# Patient Record
Sex: FEMALE | Race: WHITE | URBAN - METROPOLITAN AREA
[De-identification: names, ages, dates, MRNs, and addresses within clinical notes are randomized per-mention and may not be internally consistent; named-entity substitution may affect disease eponyms.]

---

## 2017-01-05 ENCOUNTER — OFFICE VISIT (OUTPATIENT)
Dept: OBGYN | Facility: CLINIC | Age: 32
End: 2017-01-05
Payer: COMMERCIAL

## 2017-01-05 VITALS
DIASTOLIC BLOOD PRESSURE: 66 MMHG | WEIGHT: 172.4 LBS | SYSTOLIC BLOOD PRESSURE: 122 MMHG | HEIGHT: 69 IN | BODY MASS INDEX: 25.53 KG/M2

## 2017-01-05 DIAGNOSIS — Z12.4 SCREENING FOR CERVICAL CANCER: ICD-10-CM

## 2017-01-05 DIAGNOSIS — Z01.419 ENCOUNTER FOR GYNECOLOGICAL EXAMINATION WITHOUT ABNORMAL FINDING: Primary | ICD-10-CM

## 2017-01-05 DIAGNOSIS — Z30.09 FAMILY PLANNING COUNSELING: ICD-10-CM

## 2017-01-05 DIAGNOSIS — Z11.51 SCREENING FOR HUMAN PAPILLOMAVIRUS: ICD-10-CM

## 2017-01-05 DIAGNOSIS — Z31.41 FERTILITY TESTING: ICD-10-CM

## 2017-01-05 PROCEDURE — 87624 HPV HI-RISK TYP POOLED RSLT: CPT | Mod: 90 | Performed by: OBSTETRICS & GYNECOLOGY

## 2017-01-05 PROCEDURE — 99385 PREV VISIT NEW AGE 18-39: CPT | Performed by: OBSTETRICS & GYNECOLOGY

## 2017-01-05 PROCEDURE — 99000 SPECIMEN HANDLING OFFICE-LAB: CPT | Performed by: OBSTETRICS & GYNECOLOGY

## 2017-01-05 PROCEDURE — G0145 SCR C/V CYTO,THINLAYER,RESCR: HCPCS | Mod: 90 | Performed by: OBSTETRICS & GYNECOLOGY

## 2017-01-05 ASSESSMENT — PATIENT HEALTH QUESTIONNAIRE - PHQ9: 5. POOR APPETITE OR OVEREATING: NOT AT ALL

## 2017-01-05 ASSESSMENT — ANXIETY QUESTIONNAIRES
3. WORRYING TOO MUCH ABOUT DIFFERENT THINGS: SEVERAL DAYS
5. BEING SO RESTLESS THAT IT IS HARD TO SIT STILL: NOT AT ALL
2. NOT BEING ABLE TO STOP OR CONTROL WORRYING: SEVERAL DAYS
GAD7 TOTAL SCORE: 3
1. FEELING NERVOUS, ANXIOUS, OR ON EDGE: NOT AT ALL
IF YOU CHECKED OFF ANY PROBLEMS ON THIS QUESTIONNAIRE, HOW DIFFICULT HAVE THESE PROBLEMS MADE IT FOR YOU TO DO YOUR WORK, TAKE CARE OF THINGS AT HOME, OR GET ALONG WITH OTHER PEOPLE: NOT DIFFICULT AT ALL
7. FEELING AFRAID AS IF SOMETHING AWFUL MIGHT HAPPEN: NOT AT ALL
6. BECOMING EASILY ANNOYED OR IRRITABLE: SEVERAL DAYS

## 2017-01-05 NOTE — MR AVS SNAPSHOT
After Visit Summary   1/5/2017    Aysha Gary    MRN: [id number]           Patient Information     Date Of Birth          1985        Visit Information        Provider Department      1/5/2017 3:20 PM Rosangela Worthington DO [facility]        Today's Diagnoses     Encounter for gynecological examination without abnormal finding [Z01.419]    -  1     Screening for human papillomavirus         Screening for cervical cancer         Family planning counseling         Fertility testing           Care Instructions    -Call on day 1 of next cycle:      -Scheduled lab only appointment for day 3: FSH, estradiol, AMH, TSH/Free T4      -Schedule HSG (hysterosalpingogram) for within the first 10 days of cycle.     -Consumer Price Line: [phone]    -HSG: procedure 17646    -FSH lab86 , estradiol [id number], AMH or anti-mullerian hormone [id number], TSH, Free T4 [id number]        Follow-ups after your visit        Follow-up notes from your care team     Return in about 3 weeks (around 1/26/2017).      Your next 10 appointments already scheduled     Jan 13, 2017  8:10 AM   LAB with [facility]   [facility] ([facility])    [address] [place] [id number]   [phone]           Patient must bring picture ID.  Patient should be prepared to give a urine specimen  Please do not eat 10-12 hours before your appointment if you are coming in fasting for labs on lipids, cholesterol, or glucose (sugar).  Pregnant women should follow their Care Team instructions. Water with medications is okay. Do not drink coffee or other fluids.   If you have concerns about taking  your medications, please ask at office or if scheduling via [employer], send a message by clicking on Secure Messaging, Message Your Care Team.              Who to contact     If you have questions or need follow up information about today's clinic visit or your schedule  "please contact [facility] directly at [phone].  Normal or non-critical lab and imaging results will be communicated to you by MyChart, letter or phone within 4 business days after the clinic has received the results. If you do not hear from us within 7 days, please contact the clinic through MyChart or phone. If you have a critical or abnormal lab result, we will notify you by phone as soon as possible.  Submit refill requests through [facility] [employer] or call your pharmacy and they will forward the refill request to us. Please allow 3 business days for your refill to be completed.          Additional Information About Your Visit        [employer] Information     Front Row lets you send messages to your doctor, view your test results, renew your prescriptions, schedule appointments and more. To sign up, go to [email or web address] . Click on \"Log in\" on the left side of the screen, which will take you to the Welcome page. Then click on \"Sign up Now\" on the right side of the page.     You will be asked to enter the access code listed below, as well as some personal information. Please follow the directions to create your username and password.     Your access code is: [email or web address]  Expires: 2017  4:14 PM     Your access code will  in 90 days. If you need help or a new code, please call your [place] clinic or [phone].        Care EveryWhere ID     This is your Care EveryWhere ID. This could be used by other organizations to access your [place] medical records  [id number]        Your Vitals Were     Height BMI (Body Mass Index) Last Period             5' 8.5\" (1.74 m) 25.83 kg/m2 2016 (Exact Date)          Blood Pressure from Last 3 Encounters:   17 122/66   16 105/66    Weight from Last 3 Encounters:   17 172 lb 6.4 oz (78.2 kg)   16 160 lb 14.4 oz (72.984 kg)              We Performed the Following     HPV High Risk Types DNA Cervical     Pap imaged thin " layer screen with HPV - recommended age 30 - 65          Today's Medication Changes          These changes are accurate as of: 1/5/17 11:59 PM.  If you have any questions, ask your nurse or doctor.               Stop taking these medicines if you haven't already. Please contact your care team if you have questions.     ibuprofen 600 MG tablet   Commonly known as:  ADVIL/MOTRIN   Stopped by:  Rosangela Worthington,            MAGIC MOUTHWASH (ENTER INGREDIENTS IN COMMENTS)   Stopped by:  Rosangela Worthington,                     Primary Care Provider    None Specified       No primary provider on file.        Thank you!     Thank you for choosing [facility] [place]  for your care. Our goal is always to provide you with excellent care. Hearing back from our patients is one way we can continue to improve our services. Please take a few minutes to complete the written survey that you may receive in the mail after your visit with us. Thank you!             Your Updated Medication List - Protect others around you: Learn how to safely use, store and throw away your medicines at www.disposemymeds.org.          This list is accurate as of: 1/5/17 11:59 PM.  Always use your most recent med list.                   Brand Name Dispense Instructions for use    PRENATAL VITAMIN PO

## 2017-01-05 NOTE — PATIENT INSTRUCTIONS
-Call on day 1 of next cycle:      -Scheduled lab only appointment for day 3: FSH, estradiol, AMH, TSH/Free T4      -Schedule HSG (hysterosalpingogram) for within the first 10 days of cycle.     -Consumer Price Line: [phone]    -HSG: procedure [id number]    -FSH lab86 , estradiol zfy444, AMH or anti-mullerian hormone [id number], TSH, Free T4 [id number]

## 2017-01-05 NOTE — PROGRESS NOTES
Aysha is a 31 year old No obstetric history on file. female who presents for annual exam.     Besides routine health maintenance,  she would like to discuss family planning-wants to get pregnant. Also has some pelvic pain-sometimes when ovulating or when she coughs, can feel the pain. Mother has fibroids.    HPI:  No primary care provider on file..    Last pap 3yrs ago. 2008 had abnl pap and LEEP. Has since had pregnancy. All normal paps since.   Hx of TTC x 2yrs with first daughter, and has been ttc for last 1yr.   Periods are q 28d, last 5d. No heavy cramps. No pelvic infections.    same dad as first daughter, no genital trauma/injury, chemical exposures.     Having intercourse 3x/wk. Hasn't done OPKs.   LMP 16    Exercising 45min cardio 3x/wk and strength training.    GYNECOLOGIC HISTORY:    Patient's last menstrual period was 2016 (exact date).  Her current contraception method is: none.  She  reports that she has quit smoking. She does not have any smokeless tobacco history on file.    Patient is sexually active.  STD testing offered?  Declined  Last PHQ-9 score on record =   PHQ-9 SCORE 2017   Total Score 4     Last GAD7 score on record =   AVE-7 SCORE 2017   Total Score 3     Alcohol Score = 2    HEALTH MAINTENANCE:  Cholesterol: (No results found for: CHOL had done previously but not following up right now. Normal results  Last Mammo: NA  Pap: possibly 3 years ago per patient-was normal. Abnormal between 1281-9758. Had LEEP. Normal results since.  Colonoscopy: NA  Dexa:  Never    Health maintenance updated:  yes    HISTORY:  Obstetric History       T1      TAB1   SAB0   E0   M0   L1       # Outcome Date GA Lbr Yohan/2nd Weight Sex Delivery Anes PTL Lv   2 Term 10/31/13           1 TAB                   There is no problem list on file for this patient.    Past Surgical History   Procedure Laterality Date     Leep tx, cervical       As induced abortn by d&c    "     Social History   Substance Use Topics     Smoking status: Former Smoker     Smokeless tobacco: Not on file     Alcohol Use: Yes           Current Outpatient Prescriptions   Medication Sig     Prenatal Vit-Fe Fumarate-FA (PRENATAL VITAMIN PO)      No current facility-administered medications for this visit.     No Known Allergies    Past medical, surgical, social and family histories were reviewed and updated in EPIC.    ROS:   12 point review of systems negative other than symptoms noted below.  Head: Nasal Congestion and Sore Throat  Gastrointestinal: Abdominal Pain  Genitourinary: Pelvic Pain    EXAM:  /66 mmHg  Ht 5' 8.5\" (1.74 m)  Wt 172 lb 6.4 oz (78.2 kg)  BMI 25.83 kg/m2  LMP 12/19/2016 (Exact Date)   BMI: Body mass index is 25.83 kg/(m^2).    PHYSICAL EXAM:  Constitutional:  Appearance: Well nourished, well developed, alert, in no acute distress  Neck:  Lymph Nodes:  No lymphadenopathy present    Thyroid:  Gland size normal, nontender, no nodules or masses present  on palpation  Chest:  Respiratory Effort:  Breathing unlabored  Cardiovascular:    Heart: Auscultation:  Regular rate, normal rhythm, no murmurs present  Breasts: Inspection of Breasts:  No lymphadenopathy present    Palpation of Breasts and Axillae:  No masses present on palpation, no  breast tenderness    Axillary Lymph Nodes:  No lymphadenopathy present  Gastrointestinal:   Abdominal Examination:  Abdomen nontender to palpation, tone normal without rigidity or guarding, no masses present, umbilicus without lesions   Liver and Spleen:  No hepatomegaly present, liver nontender to palpation    Hernias:  No hernias present  Lymphatic: Lymph Nodes:  No other lymphadenopathy present  Skin:  General Inspection:  No rashes present, no lesions present, no areas of  discoloration    Genitalia and Groin:  No rashes present, no lesions present, no areas of  discoloration, no masses present  Neurologic/Psychiatric:    Mental Status:  Oriented " X3     Pelvic Exam:  External Genitalia:     Normal appearance for age, no discharge present, no tenderness present, no inflammatory lesions present, color normal  Vagina:     Normal vaginal vault without central or paravaginal defects, no discharge present, no inflammatory lesions present, no masses present  Bladder:     Nontender to palpation  Urethra:   Urethral Body:  Urethra palpation normal, urethra structural support normal   Urethral Meatus:  No erythema or lesions present  Cervix:     Appearance healthy, no lesions present, nontender to palpation, no bleeding present  Uterus:     Nontender to palpation, no masses present, position anteflexed, mobility: normal  Adnexa:     No adnexal tenderness present, no adnexal masses present  Perineum:     Perineum within normal limits, no evidence of trauma, no rashes or skin lesions present  Anus:     Anus within normal limits, no hemorrhoids present  Inguinal Lymph Nodes:     No lymphadenopathy present  Pubic Hair:     Normal pubic hair distribution for age  Genitalia and Groin:     No rashes present, no lesions present, no areas of discoloration, no masses present    COUNSELING:   Reviewed preventive health counseling, as reflected in patient instructions       Family planning    BMI: Body mass index is 25.83 kg/(m^2).  Weight management plan: Discussed healthy diet and exercise guidelines and patient will follow up in 12 months in clinic to re-evaluate.    ASSESSMENT:  31 year old female with satisfactory annual exam.    ICD-10-CM    1. Encounter for gynecological examination without abnormal finding [Z01.419] Z01.419    2. Screening for human papillomavirus Z11.51 HPV High Risk Types DNA Cervical   3. Screening for cervical cancer Z12.4 Pap imaged thin layer screen with HPV - recommended age 30 - 65   4. Family planning counseling Z30.09 TSH with free T4 reflex     Follicle stimulating hormone     Estradiol     Anti-Mullerian hormone   5. Fertility testing Z31.41  TSH with free T4 reflex     Follicle stimulating hormone     Estradiol     Anti-Mullerian hormone       PLAN:  -Pap/hpv for cervical cancer screening. Reviewed guidelines.  -Breast self awareness discussed.   -Cont PNV  -Discussed fertility, fertility rates, work up, cycle timing and timed intercourse. Informed insurance coverage varies. Will give info to check pricing.   Plan labs, SA and HSG next cycle.   -Return one year for next annual exam      Rosangela Hung Masters, DO

## 2017-01-06 ASSESSMENT — PATIENT HEALTH QUESTIONNAIRE - PHQ9: SUM OF ALL RESPONSES TO PHQ QUESTIONS 1-9: 4

## 2017-01-06 ASSESSMENT — ANXIETY QUESTIONNAIRES: GAD7 TOTAL SCORE: 3

## 2017-01-10 LAB
COPATH REPORT: NORMAL
PAP: NORMAL

## 2017-01-11 LAB
FINAL DIAGNOSIS: NORMAL
HPV HR 12 DNA CVX QL NAA+PROBE: NEGATIVE
HPV16 DNA SPEC QL NAA+PROBE: NEGATIVE
HPV18 DNA SPEC QL NAA+PROBE: NEGATIVE
SPECIMEN DESCRIPTION: NORMAL

## 2017-01-13 DIAGNOSIS — Z31.41 FERTILITY TESTING: ICD-10-CM

## 2017-01-13 DIAGNOSIS — Z30.09 FAMILY PLANNING COUNSELING: ICD-10-CM

## 2017-01-13 LAB
ESTRADIOL SERPL-MCNC: 33 PG/ML
FSH SERPL-ACNC: 5 IU/L
TSH SERPL DL<=0.005 MIU/L-ACNC: 1.19 MU/L (ref 0.4–4)

## 2017-01-13 PROCEDURE — 83001 ASSAY OF GONADOTROPIN (FSH): CPT | Performed by: OBSTETRICS & GYNECOLOGY

## 2017-01-13 PROCEDURE — 99000 SPECIMEN HANDLING OFFICE-LAB: CPT | Performed by: OBSTETRICS & GYNECOLOGY

## 2017-01-13 PROCEDURE — 82670 ASSAY OF TOTAL ESTRADIOL: CPT | Performed by: OBSTETRICS & GYNECOLOGY

## 2017-01-13 PROCEDURE — 84443 ASSAY THYROID STIM HORMONE: CPT | Performed by: OBSTETRICS & GYNECOLOGY

## 2017-01-13 PROCEDURE — 36415 COLL VENOUS BLD VENIPUNCTURE: CPT | Performed by: OBSTETRICS & GYNECOLOGY

## 2017-01-13 PROCEDURE — 83520 IMMUNOASSAY QUANT NOS NONAB: CPT | Mod: 90 | Performed by: OBSTETRICS & GYNECOLOGY

## 2017-01-16 LAB — MIS SERPL-MCNC: 5.31 NG/ML

## 2017-01-17 ENCOUNTER — TELEPHONE (OUTPATIENT)
Dept: OBGYN | Facility: CLINIC | Age: 32
End: 2017-01-17

## 2017-01-17 NOTE — TELEPHONE ENCOUNTER
SUMAM with times of HSG    HSG @ [facility]  1/20/2017 @ 1:00PM ok per Masters    Kiana Silva  Surgery Scheduler

## 2017-01-17 NOTE — PROGRESS NOTES
Quick Note:    Labs are normal. We can discuss more in depth at her HSG visit. Please call pt to notify.     Rosangela Hernadezs, DO    ______

## 2017-01-20 ENCOUNTER — TRANSFERRED RECORDS (OUTPATIENT)
Dept: HEALTH INFORMATION MANAGEMENT | Facility: CLINIC | Age: 32
End: 2017-01-20

## 2017-01-20 ENCOUNTER — OFFICE VISIT (OUTPATIENT)
Dept: OBGYN | Facility: CLINIC | Age: 32
End: 2017-01-20
Payer: COMMERCIAL

## 2017-01-20 DIAGNOSIS — N97.9 FEMALE INFERTILITY, SECONDARY: Primary | ICD-10-CM

## 2017-01-20 PROCEDURE — 58340 CATHETER FOR HYSTEROGRAPHY: CPT | Performed by: OBSTETRICS & GYNECOLOGY

## 2017-01-20 NOTE — PROGRESS NOTES
INDICATIONS:                                                      Is a pregnancy test required: No. Day 10 0f regular menstrual cycle  Was a consent obtained?  Yes    Aysha  is here for HSG.       PROCEDURE:                                                      Patient was placed in supine position on X-ray table. Cervix was swabbed with Betadine.  Tenaculum placed on cervix and HSG balloon catheter was applied. She was positioned under the x-ray by the radiologist and contrast was instilled.    Appropriate images were obtained after contrast was instilled.    HSG is being done for infertility.    Findings:  normal cavity and fill and spill of both tubes    The findings were discussed with the patient. She tolerated the procedure well. There were no complications. Patient was discharged in stable condition.    Patient counseled she may have increased cramping and spotting later today.  Plan to be determined following radiologist's final read.     Reviewed labs wnl.  will get SA and then return to clinic for planning next step.    Rosangela Hung Masters, DO

## 2017-04-18 ENCOUNTER — DOCUMENTATION ONLY (OUTPATIENT)
Dept: OBGYN | Facility: CLINIC | Age: 32
End: 2017-04-18

## 2017-04-18 NOTE — PROGRESS NOTES
Nick's SA results received from [facility]: Abnormals noted below    5d abstinence, 4/5/17 collection    Vol low 1.4mL (>1.4)  Forward Progression low 70% (>74)  Speed of progression abnormal 2 (3-4)  Concentration low 0.7mill/ml (>14.9)  Total Count low 1mill (>38.9)  Total Motile low 0.4 mill (>14.9)  Morphology abnormal 1% (>3%)        Called to discuss with pt, VM left that I will call her back.   Due to number and degree of abnormalities, will recommend pt's  Nick see a Urologist for evaluation, and will need to repeat the sample. If continues to be abnormal or additional findings noted, will then have RADHA consult.    Rosangela Hung Masters, DO

## 2017-04-21 NOTE — PROGRESS NOTES
Attempted to call pt. Left message for her to return call to clinic to discuss.    Rosangela Hung Masters, DO

## 2017-04-24 ENCOUNTER — TELEPHONE (OUTPATIENT)
Dept: NURSING | Facility: CLINIC | Age: 32
End: 2017-04-24

## 2017-04-25 NOTE — PROGRESS NOTES
Attempted to call Nick. Left him a message instructing him to call back to the clinic and talk with the nurses. The results are listed below in my note from 4/18/17, multiple abnormalities including low count and abnormal motility and shape, see the last paragraph with f/u instructions.   I will contact them when I am back in the office to get him the phone number to a couple urologists I would recommend.     Rosangela Hung Masters, DO

## 2017-04-26 ENCOUNTER — TELEPHONE (OUTPATIENT)
Dept: OBGYN | Facility: CLINIC | Age: 32
End: 2017-04-26

## 2017-04-26 NOTE — TELEPHONE ENCOUNTER
"Called Nick and informed him of his SA results. Read documentation only encounter: \"Called to discuss with pt, VM left that I will call her back. Due to number and degree of abnormalities, will recommend pt's  Nick see a Urologist for evaluation, and will need to repeat the sample. If continues to be abnormal or additional findings noted, will then have RADHA consult. Rosangela Worthington, DO.\" Pt stated understanding, he stated that in one of the messages Dr. Worthington mentioned a specific urologist, pt wondering who that was so he could call to make an appt. Note routed to Dr. Worthington to review and advise.   "

## 2017-04-27 NOTE — TELEPHONE ENCOUNTER
Please call and inform leoncio of urologist options:   -Dr. TOYIN Miner at  [facility] [phone]  -Dr. Dickson at [facility] [phone]  -Or of course she can seek out any others she can find.    Rosangela Hung Masters, DO

## 2018-07-19 ENCOUNTER — OFFICE VISIT (OUTPATIENT)
Dept: OBGYN | Facility: CLINIC | Age: 33
End: 2018-07-19
Payer: COMMERCIAL

## 2018-07-19 VITALS — WEIGHT: 153 LBS | BODY MASS INDEX: 22.93 KG/M2 | SYSTOLIC BLOOD PRESSURE: 116 MMHG | DIASTOLIC BLOOD PRESSURE: 72 MMHG

## 2018-07-19 DIAGNOSIS — Z31.81 FEMALE INFERTILITY ASSOCIATED WITH MALE FACTORS: Primary | ICD-10-CM

## 2018-07-19 DIAGNOSIS — N97.8 FEMALE INFERTILITY ASSOCIATED WITH MALE FACTORS: Primary | ICD-10-CM

## 2018-07-19 PROCEDURE — 99213 OFFICE O/P EST LOW 20 MIN: CPT | Performed by: OBSTETRICS & GYNECOLOGY

## 2018-07-19 NOTE — PROGRESS NOTES
SUBJECTIVE:                                                   Aysha Gary is a 33 year old female who presents to clinic today for the following health issue(s):  Patient presents with:  Consult: discuss infertility, next steps      HPI:  Has been some time as her mother in law has been ill, needs kidney transplant.      saw Urology, had evaluation/testing. Counts low, no structural or hormonal issues.   Both and she and  have lost wt and working on lifestyle/diet changes.     Was tracking ovulation and cycles. Usually ovulates day 13-14. Cycles consistent. No changes.       Patient's last menstrual period was 2018 (exact date)..   Patient is sexually active, .  Using none for contraception.    reports that she has quit smoking. She has never used smokeless tobacco.    STD testing offered?  Declined    Health maintenance updated:  yes    Today's PHQ-2 Score: No flowsheet data found.  Today's PHQ-9 Score:   PHQ-9 SCORE 2017   Total Score 4     Today's AVE-7 Score:   AVE-7 SCORE 2017   Total Score 3       Problem list and histories reviewed & adjusted, as indicated.  Additional history: as documented.    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     Past Surgical History:   Procedure Laterality Date     AS INDUCED ABORTN BY D&C       LEEP TX, CERVICAL  2008      Social History   Substance Use Topics     Smoking status: Former Smoker     Smokeless tobacco: Never Used     Alcohol use Yes      Problem (# of Occurrences) Relation (Name,Age of Onset)    Coronary Artery Disease (1) Unspecified    Hyperlipidemia (1) Unspecified    Hypertension (1) Unspecified    Thyroid Disease (1) Unspecified            Current Outpatient Prescriptions   Medication Sig     Prenatal Vit-Fe Fumarate-FA (PRENATAL VITAMIN PO)      No current facility-administered medications for this visit.      No Known  Allergies    ROS:  Genitourinary: Pelvic Pain    OBJECTIVE:     /72  Wt 153 lb (69.4 kg)  LMP 07/14/2018 (Exact Date)  Breastfeeding? No  BMI 22.93 kg/m2  Body mass index is 22.93 kg/(m^2).    Exam:  Constitutional:  Appearance: Well nourished, well developed alert, in no acute distress  Chest:  Respiratory Effort:  Breathing unlabored  Neurologic/Psychiatric:  Mental Status:  Oriented X3        ASSESSMENT/PLAN:                                                        ICD-10-CM    1. 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 Based on this we will then know how to proceed. If still quite abnormal, will have consult with RADHA-discussed may need procedures such as ICSI or IVF.  Otherwise, may try unmedicated cycles- up to 3- with FS at day 13 and ovidrel/IUI with semen prep at [facility]. Discussed this process, added cost of semen prep.   If at end of 3 cycles, no conception, will repeat Aysha's labs and send to RADHA for consult.  Questions answered.   Pt happy with plan.     Rosangela Hung Masters, DO  [facility] [place]

## 2018-07-19 NOTE — MR AVS SNAPSHOT
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      Who to contact     If you have questions or need follow up information about today's clinic visit or your schedule please contact [facility] directly at [phone].  Normal or non-critical lab and imaging results will be communicated to you by MyChart, letter or phone within 4 business days after the clinic has received the results. If you do not hear from us within 7 days, please contact the clinic through MyChart or phone. If you have a critical or abnormal lab result, we will notify you by phone as soon as possible.  Submit refill requests through [employer] or call your pharmacy and they will forward the refill request to us. Please allow 3 business days for your refill to be completed.          Additional Information About Your Visit        MyChart Information     [employer] lets you send messages to your doctor, view your test results, renew your prescriptions, schedule appointments and more. To sign up, go to [email or web address] . Click on \"Log in\" on the left side of the screen, which will take you to the Welcome page. Then click on \"Sign up Now\" on the right side of the page.     You will be asked to enter the access code listed below, as well as some personal information. Please follow the directions to create your username and password.     Your access code is: [email or web address]  Expires: 10/17/2018  8:39 AM     Your access code will  in 90 days. If you need help or a new code, please " call your [place] clinic or [phone].        Care EveryWhere ID     This is your Care EveryWhere ID. This could be used by other organizations to access your [place] medical records  [id number]        Your Vitals Were     Last Period Breastfeeding? BMI (Body Mass Index)             07/14/2018 (Exact Date) No 22.93 kg/m2          Blood Pressure from Last 3 Encounters:   07/19/18 116/72   01/05/17 122/66   04/24/16 105/66    Weight from Last 3 Encounters:   07/19/18 153 lb (69.4 kg)   01/05/17 172 lb 6.4 oz (78.2 kg)   04/24/16 160 lb 14.4 oz (73 kg)              Today, you had the following     No orders found for display       Primary Care Provider Office Phone # Fax #    [facility] [place] [facility] [phone]       [address] [place]  [address] [address]        Equal Access to Services     MAHAD VARELA : Hadii aad ku hadasho Soomaali, waaxda luqadaha, qaybta kaalmada adeegyada, waxay idiin hayhaliman raquel reno . So [facility] [phone].    ATENCIÓN: Si habla español, tiene a gonzalez disposición servicios gratuitos de asistencia lingüística. Llame al [phone].    We comply with applicable federal civil rights laws and [place] laws. We do not discriminate on the basis of race, color, national origin, age, disability, sex, sexual orientation, or gender identity.            Thank you!     Thank you for choosing [facility]  for your care. Our goal is always to provide you with excellent care. Hearing back from our patients is one way we can continue to improve our services. Please take a few minutes to complete the written survey that you may receive in the mail after your visit with us. Thank you!             Your Updated Medication List - Protect others around you: Learn how to safely use, store and throw away your medicines at www.disposemymeds.org.          This list is accurate as of 7/19/18 10:01 AM.  Always  use your most recent med list.                   Brand Name Dispense Instructions for use Diagnosis    PRENATAL VITAMIN PO

## 2018-08-13 ENCOUNTER — MYC MEDICAL ADVICE (OUTPATIENT)
Dept: OBGYN | Facility: CLINIC | Age: 33
End: 2018-08-13

## 2018-08-13 NOTE — TELEPHONE ENCOUNTER
See Mychart msg          OV note dated 7/19/18  Dr Worthington  Was tracking ovulation and cycles. Usually ovulates day 13-14. Cycles consistent. No changes.     -Will repeat Nick's SA. Based on this we will then know how to proceed. If still quite abnormal, will have consult with RADHA-discussed may need procedures such as ICSI or IVF.  Otherwise, may try unmedicated cycles- up to 3- with FS at day 13 and ovidrel/IUI with semen prep at [facility]. Discussed this process, added cost of semen prep.   If at end of 3 cycles, no conception, will repeat Aysha's labs and send to RADHA for consult.  Questions answered.   Pt happy with plan.

## 2018-08-15 ENCOUNTER — TELEPHONE (OUTPATIENT)
Dept: OBGYN | Facility: CLINIC | Age: 33
End: 2018-08-15

## 2018-08-15 DIAGNOSIS — N97.9 FEMALE INFERTILITY, SECONDARY: Primary | ICD-10-CM

## 2018-08-23 ENCOUNTER — OFFICE VISIT (OUTPATIENT)
Dept: OBGYN | Facility: CLINIC | Age: 33
End: 2018-08-23
Payer: COMMERCIAL

## 2018-08-23 ENCOUNTER — RADIANT APPOINTMENT (OUTPATIENT)
Dept: ULTRASOUND IMAGING | Facility: CLINIC | Age: 33
End: 2018-08-23
Payer: COMMERCIAL

## 2018-08-23 VITALS — BODY MASS INDEX: 23.19 KG/M2 | WEIGHT: 154.8 LBS | SYSTOLIC BLOOD PRESSURE: 110 MMHG | DIASTOLIC BLOOD PRESSURE: 68 MMHG

## 2018-08-23 DIAGNOSIS — N97.9 INFERTILITY, FEMALE: Primary | ICD-10-CM

## 2018-08-23 DIAGNOSIS — N97.9 INFERTILITY, FEMALE: ICD-10-CM

## 2018-08-23 PROCEDURE — 99212 OFFICE O/P EST SF 10 MIN: CPT | Performed by: OBSTETRICS & GYNECOLOGY

## 2018-08-23 PROCEDURE — 76830 TRANSVAGINAL US NON-OB: CPT | Performed by: OBSTETRICS & GYNECOLOGY

## 2018-08-23 NOTE — PROGRESS NOTES
Aysha Gary  is here for follicle study on Day # 13 of cycle.     This is a natural cycle without clomid    She has been using home LH monitoring with no flashing symbol as of yet    Follicle study shows one follicle of 10 mm on the left and one follicle of 13 mm on the right side.      We discussed that there is no dominant follicle on today's sonogram and so scheduling IUI would not be appropriate. Her  repeated his semen analysis 2 weeks ago but she has not been called with the results as of yet. I encouraged her to use the home LH monitoring and to alert us if she gets a solid smiley face over the weekend. I advised her to repeat her follicle study with Dr. Worthington on Monday.    10  minutes was spent face to face with the patient today discussing her history, diagnosis, and follow-up plan as noted above. Over 50% of the visit was spent in counseling and coordination of care.    Total Visit Time: 10 minutes.     Mayte Hoffmann MD

## 2018-08-23 NOTE — MR AVS SNAPSHOT
After Visit Summary   8/23/2018    Aysha Gary    MRN: [id number]           Patient Information     Date Of Birth          1985        Visit Information        Provider Department      8/23/2018 4:00 PM Mayte Hoffmann MD [facility]        Today's Diagnoses     Infertility, female    -  1       Follow-ups after your visit        Your next 10 appointments already scheduled     Aug 27, 2018  1:30 PM CDT   US TRANSVAGINAL NON OB with WEUS1   [facility] ([facility])    [address]  [facility] [address] [phone]           Please bring a list of your medicines (including vitamins, minerals and over-the-counter drugs). Also, tell your doctor about any allergies you may have. Wear comfortable clothes and leave your valuables at home.  You do not need to do anything special to prepare for your exam.  Please call the Imaging Department at your exam site with any questions.            Aug 27, 2018  2:10 PM CDT   SHORT with Mayte Hoffmann MD   [facility]    [address]  [facility] [id number] [phone]              Future tests that were ordered for you today     Open Future Orders        Priority Expected Expires Ordered    US Transvaginal Non OB Routine  8/23/2019 8/23/2018            Who to contact     If you have questions or need follow up information about today's clinic visit or your schedule please contact [facility] directly at [phone].  Normal or non-critical lab and imaging results will be communicated to you by MyChart, letter or phone within 4 business days after the clinic has received the results. If you do not hear from us within 7 days, please contact the clinic through MyChart or phone. If you have a critical or abnormal lab result, we will notify you by phone as  soon as possible.  Submit refill requests through Newsle or call your pharmacy and they will forward the refill request to us. Please allow 3 business days for your refill to be completed.          Additional Information About Your Visit        mEgohart Information     Newsle gives you secure access to your electronic health record. If you see a primary care provider, you can also send messages to your care team and make appointments. If you have questions, please call your primary care clinic.  If you do not have a primary care provider, please call [phone] and they will assist you.        Care EveryWhere ID     This is your Care EveryWhere ID. This could be used by other organizations to access your [place] medical records  [id number]        Your Vitals Were     Last Period Breastfeeding? BMI (Body Mass Index)             08/11/2018 (Exact Date) No 23.19 kg/m2          Blood Pressure from Last 3 Encounters:   08/23/18 110/68   07/19/18 116/72   01/05/17 122/66    Weight from Last 3 Encounters:   08/23/18 154 lb 12.8 oz (70.2 kg)   07/19/18 153 lb (69.4 kg)   01/05/17 172 lb 6.4 oz (78.2 kg)               Primary Care Provider Office Phone # Fax #    [facility] [phone]       [place] [address]  [facility] [address]  [facility] [address]        Equal Access to Services     MAHAD VARELA AH: Hadii bi ku hadasho Sosherlynali, waaxda luqadaha, qaybta kaalmada adeegyada, adrien reno . So [facility] [phone].    ATENCIÓN: Si habla español, tiene a gonzalez disposición servicios gratuitos de asistencia lingüística. Llame al [phone].    We comply with applicable federal civil rights laws and [place] laws. We do not discriminate on the basis of race, color, national origin, age, disability, sex, sexual orientation, or gender identity.            Thank you!     Thank you for choosing [facility] [place]  for your  care. Our goal is always to provide you with excellent care. Hearing back from our patients is one way we can continue to improve our services. Please take a few minutes to complete the written survey that you may receive in the mail after your visit with us. Thank you!             Your Updated Medication List - Protect others around you: Learn how to safely use, store and throw away your medicines at www.disposemymeds.org.          This list is accurate as of 8/23/18  4:32 PM.  Always use your most recent med list.                   Brand Name Dispense Instructions for use Diagnosis    PRENATAL VITAMIN PO

## 2018-08-27 ENCOUNTER — OFFICE VISIT (OUTPATIENT)
Dept: OBGYN | Facility: CLINIC | Age: 33
End: 2018-08-27
Attending: OBSTETRICS & GYNECOLOGY
Payer: COMMERCIAL

## 2018-08-27 ENCOUNTER — RADIANT APPOINTMENT (OUTPATIENT)
Dept: ULTRASOUND IMAGING | Facility: CLINIC | Age: 33
End: 2018-08-27
Attending: OBSTETRICS & GYNECOLOGY
Payer: COMMERCIAL

## 2018-08-27 VITALS
BODY MASS INDEX: 23.34 KG/M2 | SYSTOLIC BLOOD PRESSURE: 108 MMHG | DIASTOLIC BLOOD PRESSURE: 66 MMHG | WEIGHT: 154 LBS | HEART RATE: 60 BPM | HEIGHT: 68 IN

## 2018-08-27 DIAGNOSIS — N97.9 INFERTILITY, FEMALE: ICD-10-CM

## 2018-08-27 DIAGNOSIS — N97.9 FEMALE INFERTILITY, SECONDARY: Primary | ICD-10-CM

## 2018-08-27 PROCEDURE — 76830 TRANSVAGINAL US NON-OB: CPT | Performed by: OBSTETRICS & GYNECOLOGY

## 2018-08-27 PROCEDURE — 99213 OFFICE O/P EST LOW 20 MIN: CPT | Performed by: OBSTETRICS & GYNECOLOGY

## 2018-08-27 NOTE — MR AVS SNAPSHOT
After Visit Summary   8/27/2018    Aysha Gary    MRN: [id number]           Patient Information     Date Of Birth          1985        Visit Information        Provider Department      8/27/2018 2:10 PM Mayte Hoffmann MD [facility]        Today's Diagnoses     Female infertility, secondary    -  1       Follow-ups after your visit        Your next 10 appointments already scheduled     Aug 29, 2018 10:50 AM CDT   SHORT with WE PROC RM   [facility] ([facility]    [address] [place] [address] [address]  [facility] [address]   [phone]            Aug 29, 2018 11:20 AM CDT   LAB with WE LAB   [facility] ([facility])    [address] [place] [id number] [phone]           Please do not eat 10-12 hours before your appointment if you are coming in fasting for labs on lipids, cholesterol, or glucose (sugar). This does not apply to pregnant women. Water, hot tea and black coffee (with nothing added) are okay. Do not drink other fluids, diet soda or chew gum.            Aug 29, 2018 12:20 PM CDT   SHORT with Breann Jonas MD   [facility] ([facility])    [address] [place] [id number] [phone]              Who to contact     If you have questions or need follow up information about today's clinic visit or your schedule please contact [facility] directly at [phone].  Normal or non-critical lab and imaging results will be communicated to you by MyChart, letter or phone within 4 business days after the clinic has received the results. If you do not hear from us within 7 days, please contact the clinic through MyChart or phone. If you have a critical or abnormal lab result, we will notify you by phone as soon as possible.  Submit refill requests through  "Alliquahart or call your pharmacy and they will forward the refill request to us. Please allow 3 business days for your refill to be completed.          Additional Information About Your Visit        MyChart Information     GrubHub gives you secure access to your electronic health record. If you see a primary care provider, you can also send messages to your care team and make appointments. If you have questions, please call your primary care clinic.  If you do not have a primary care provider, please call [phone] and they will assist you.        Care EveryWhere ID     This is your Care EveryWhere ID. This could be used by other organizations to access your [place] medical records  [id number]        Your Vitals Were     Pulse Height Last Period Breastfeeding? BMI (Body Mass Index)       60 5' 8.25\" (1.734 m) 08/11/2018 (Exact Date) No 23.24 kg/m2        Blood Pressure from Last 3 Encounters:   08/27/18 108/66   08/23/18 110/68   07/19/18 116/72    Weight from Last 3 Encounters:   08/27/18 154 lb (69.9 kg)   08/23/18 154 lb 12.8 oz (70.2 kg)   07/19/18 153 lb (69.4 kg)              Today, you had the following     No orders found for display         Today's Medication Changes          These changes are accurate as of 8/27/18  2:32 PM.  If you have any questions, ask your nurse or doctor.               Start taking these medicines.        Dose/Directions    choriogonadotropin aristeo 250 MCG/0.5ML injection   Commonly known as:  OVIDREL   Used for:  Female infertility, secondary   Started by:  Mayte Hoffmann MD        Dose:  250 mcg   Inject 0.5 mLs (250 mcg) Subcutaneous once for 1 dose   Quantity:  0.5 mL   Refills:  0            Where to get your medicines      Some of these will need a paper prescription and others can be bought over the counter.  Ask your nurse if you have questions.     Bring a paper prescription for each of these medications     choriogonadotropin aristeo 250 MCG/0.5ML injection    "             Primary Care Provider Office Phone # Fax #    [facility] [phone]       [facility] [address]  [facility] [address]        Equal Access to Services     MAHAD VARELA : Hadii bi guerrier hadsebastiano Soomaali, waaxda luqadaha, qaybta kaalmada adeegyada, adrien servando hayhaliman raquel janetlilly holm. So [facility] [phone].    ATENCIÓN: Si habla español, tiene a gonzalez disposición servicios gratuitos de asistencia lingüística. Llame al [phone].    We comply with applicable federal civil rights laws and [place] laws. We do not discriminate on the basis of race, color, national origin, age, disability, sex, sexual orientation, or gender identity.            Thank you!     Thank you for choosing [facility]  for your care. Our goal is always to provide you with excellent care. Hearing back from our patients is one way we can continue to improve our services. Please take a few minutes to complete the written survey that you may receive in the mail after your visit with us. Thank you!             Your Updated Medication List - Protect others around you: Learn how to safely use, store and throw away your medicines at www.disposemymeds.org.          This list is accurate as of 8/27/18  2:32 PM.  Always use your most recent med list.                   Brand Name Dispense Instructions for use Diagnosis    choriogonadotropin aristeo 250 MCG/0.5ML injection    OVIDREL    0.5 mL    Inject 0.5 mLs (250 mcg) Subcutaneous once for 1 dose    Female infertility, secondary       PRENATAL VITAMIN PO

## 2018-08-27 NOTE — PROGRESS NOTES
Aysha Gary  is here for follicle study on Day # 17 of cycle.     This is a natural cycle without clomid or femara. The last time she was seen she was pre-ovulatory. She has noted flashing signs of increased estrogen on her OPK    Follicle study shows one dominant follicle and a trilaminar endometrium.    We will proceed with an HCG trigger and schedule an IUI for the morning of 8/29. She was asked to have unprotected intercourse tonight with her  and abstain tomorrow in preparation for the IUI on 8/29. Ovidrel teaching was completed today.    15 minutes was spent face to face with the patient today discussing her history, diagnosis, and follow-up plan as noted above. Over 50% of the visit was spent in counseling and coordination of care.    Total Visit Time: 15 minutes.     Mayte Hoffmann MD

## 2018-08-29 ENCOUNTER — OFFICE VISIT (OUTPATIENT)
Dept: OBGYN | Facility: CLINIC | Age: 33
End: 2018-08-29
Payer: COMMERCIAL

## 2018-08-29 ENCOUNTER — APPOINTMENT (OUTPATIENT)
Dept: LAB | Facility: CLINIC | Age: 33
End: 2018-08-29
Payer: COMMERCIAL

## 2018-08-29 ENCOUNTER — APPOINTMENT (OUTPATIENT)
Dept: OBGYN | Facility: CLINIC | Age: 33
End: 2018-08-29
Payer: COMMERCIAL

## 2018-08-29 VITALS — WEIGHT: 154 LBS | DIASTOLIC BLOOD PRESSURE: 66 MMHG | SYSTOLIC BLOOD PRESSURE: 108 MMHG | BODY MASS INDEX: 23.24 KG/M2

## 2018-08-29 DIAGNOSIS — N97.9 FEMALE INFERTILITY, SECONDARY: Primary | ICD-10-CM

## 2018-08-29 PROCEDURE — 58322 ARTIFICIAL INSEMINATION: CPT | Performed by: OBSTETRICS & GYNECOLOGY

## 2018-08-29 NOTE — MR AVS SNAPSHOT
After Visit Summary   8/29/2018    Aysha Gary    MRN: [id number]           Patient Information     Date Of Birth          1985        Visit Information        Provider Department      8/29/2018 12:20 PM Breann Jonas MD [facility] [place]        Today's Diagnoses     Female infertility, secondary    -  1       Follow-ups after your visit        Who to contact     If you have questions or need follow up information about today's clinic visit or your schedule please contact [facility] directly at [phone].  Normal or non-critical lab and imaging results will be communicated to you by Lookingglass Cyber Solutionshart, letter or phone within 4 business days after the clinic has received the results. If you do not hear from us within 7 days, please contact the clinic through Montage Studiot or phone. If you have a critical or abnormal lab result, we will notify you by phone as soon as possible.  Submit refill requests through FlightStats or call your pharmacy and they will forward the refill request to us. Please allow 3 business days for your refill to be completed.          Additional Information About Your Visit        MyChart Information     FlightStats gives you secure access to your electronic health record. If you see a primary care provider, you can also send messages to your care team and make appointments. If you have questions, please call your primary care clinic.  If you do not have a primary care provider, please call [phone] and they will assist you.        Care EveryWhere ID     This is your Care EveryWhere ID. This could be used by other organizations to access your [place] medical records  [id number]        Your Vitals Were     Last Period Breastfeeding? BMI (Body Mass Index)             08/11/2018 (Exact Date) No 23.24 kg/m2          Blood Pressure from Last 3 Encounters:   08/29/18 108/66   08/27/18 108/66   08/23/18 110/68    Weight from Last 3 Encounters:    08/29/18 154 lb (69.9 kg)   08/27/18 154 lb (69.9 kg)   08/23/18 154 lb 12.8 oz (70.2 kg)              We Performed the Following     ARTIFICIAL INSEMINATION - INTRA-UTERINE        Primary Care Provider Office Phone # Fax #    [facility] [phone]       [place] [address] [facility] [address]  [place] [address]        Equal Access to Services     MAHAD VARELA : Hadii aad ku hadasho Soomaali, waaxda luqadaha, qaybta kaalmada adeegyada, waxay idiin hayaan adeeg marilyn reno . So [facility] [phone].    ATENCIÓN: Si habla español, tiene a gonzalez disposición servicios gratuitos de asistencia lingüística. Llame al [phone].    We comply with applicable federal civil rights laws and [place] laws. We do not discriminate on the basis of race, color, national origin, age, disability, sex, sexual orientation, or gender identity.            Thank you!     Thank you for choosing [facility] [place]  for your care. Our goal is always to provide you with excellent care. Hearing back from our patients is one way we can continue to improve our services. Please take a few minutes to complete the written survey that you may receive in the mail after your visit with us. Thank you!             Your Updated Medication List - Protect others around you: Learn how to safely use, store and throw away your medicines at www.disposemymeds.org.          This list is accurate as of 8/29/18 12:44 PM.  Always use your most recent med list.                   Brand Name Dispense Instructions for use Diagnosis    choriogonadotropin aristeo 250 MCG/0.5ML injection    OVIDREL    0.5 mL    Inject 0.5 mLs (250 mcg) Subcutaneous once for 1 dose    Female infertility, secondary       PRENATAL VITAMIN PO

## 2018-08-29 NOTE — PROGRESS NOTES
INDICATIONS:                                                      Is a pregnancy test required: No.  Was a consent obtained?  Yes    [facility] She is on cycle day 19. She is taking no medications this cycle. Insemination is timed by HCG injection. This is insemination number 1.     PROCEDURE:                                                      After identification and confirmation of the specimen, the patient agreed to proceed. Speculum was inserted into the vagina. Cervix was visualized. The specimen was drawn up into the insemination catheter. The catheter was advanced into the uterine cavity.     POST  PROCEDURE:                                                      The patient did not experience cramping afterwards.  She rested on her back for 10 minutes. She  tolerated the procedure well. There were no complications. Patient was discharged in stable condition.    call if preg test pos      Breann Jonas MD

## 2018-09-10 ENCOUNTER — TELEPHONE (OUTPATIENT)
Dept: OBGYN | Facility: CLINIC | Age: 33
End: 2018-09-10

## 2018-09-10 DIAGNOSIS — Z31.81 FEMALE INFERTILITY ASSOCIATED WITH MALE FACTORS: Primary | ICD-10-CM

## 2018-09-10 DIAGNOSIS — N97.8 FEMALE INFERTILITY ASSOCIATED WITH MALE FACTORS: Primary | ICD-10-CM

## 2018-09-10 NOTE — TELEPHONE ENCOUNTER
Called pt, VM reached. Left message for her to return call.   Discuss plans for fertilty cycle and SA results.    Rosangela Hung Masters, DO

## 2018-09-12 ENCOUNTER — TELEPHONE (OUTPATIENT)
Dept: OBGYN | Facility: CLINIC | Age: 33
End: 2018-09-12

## 2018-09-12 NOTE — TELEPHONE ENCOUNTER
Pt and  would like to do IUI. Pt understands that she will call when she has a surge on OPK to schedule IUI. Pt is wondering how she will get Ovidrell. Informed when has positive OPK ask to speak to triage and we can get the order from Dr. Worthington or on call doctor. Routing to Dr. Worthington for review.     9/10/18 Had been checking OPKs and was getting ovulation around day 14-15. Unmedicated cycle planned, did unmedicated/IUI last cycle. Will be day 13 for FS this time around (friday).   Discussed using OPKs to time IUI in future as appear to be reliable for her.  Also discussed again low counts for SA and offered to go directly to [facility] for IVF and possible ICSI. May continue with cycles and IUI, but would want her to consider [facility] if no conception after 3.  Pt will discuss with Nick. Will let us know.      Rosangela Worthington, DO

## 2018-09-13 NOTE — TELEPHONE ENCOUNTER
Should check OPK in the morning. And call as triage had directed with positive. Ovidrel is not needed. IUI would be scheduled then day after her positive OPK.     Rosangela Hung Masters, DO

## 2018-09-21 ENCOUNTER — TELEPHONE (OUTPATIENT)
Dept: NURSING | Facility: CLINIC | Age: 33
End: 2018-09-21

## 2018-09-21 NOTE — TELEPHONE ENCOUNTER
Pt had a LH surge this morning. Calling to schedule IUI for tomorrow. Reviewed by Dr. Worthington. OK to schedule. CRM called and appointments are 8:45 at andrology lab for Nick. Must bring photo ID. And 11:15 for Aysha at [facility]. Pt informed. Given address and phone numbers. Also stressed need for photo ID for both. Orders faxed to [facility] and andrology lab.

## 2020-03-10 ENCOUNTER — HEALTH MAINTENANCE LETTER (OUTPATIENT)
Age: 35
End: 2020-03-10

## 2020-12-27 ENCOUNTER — HEALTH MAINTENANCE LETTER (OUTPATIENT)
Age: 35
End: 2020-12-27

## 2021-04-24 ENCOUNTER — HEALTH MAINTENANCE LETTER (OUTPATIENT)
Age: 36
End: 2021-04-24

## 2021-10-09 ENCOUNTER — HEALTH MAINTENANCE LETTER (OUTPATIENT)
Age: 36
End: 2021-10-09

## 2022-05-16 ENCOUNTER — HEALTH MAINTENANCE LETTER (OUTPATIENT)
Age: 37
End: 2022-05-16

## 2022-09-11 ENCOUNTER — HEALTH MAINTENANCE LETTER (OUTPATIENT)
Age: 37
End: 2022-09-11

## 2023-05-29 NOTE — TELEPHONE ENCOUNTER
Had been checking OPKs and was getting ovulation around day 14-15. Unmedicated cycle planned, did unmedicated/IUI last cycle. Will be day 13 for FS this time around (friday).   Discussed using OPKs to time IUI in future as appear to be reliable for her.  Also discussed again low counts for SA and offered to go directly to [facility] for IVF and possible ICSI. May continue with cycles and IUI, but would want her to consider [facility] if no conception after 3.  Pt will discuss with Nick. Will let us know.     Rosangela Hung Masters, DO     Poor

## 2023-06-03 ENCOUNTER — HEALTH MAINTENANCE LETTER (OUTPATIENT)
Age: 38
End: 2023-06-03

## 2024-06-18 NOTE — TELEPHONE ENCOUNTER
Patient has questions about plan moving forward after next IUI.     What does she want to know?  Can you please get more info    She is not on Dialysis, she is CKD stage IV